# Patient Record
Sex: FEMALE | Race: WHITE | NOT HISPANIC OR LATINO | Employment: OTHER | URBAN - METROPOLITAN AREA
[De-identification: names, ages, dates, MRNs, and addresses within clinical notes are randomized per-mention and may not be internally consistent; named-entity substitution may affect disease eponyms.]

---

## 2024-04-10 ENCOUNTER — TELEPHONE (OUTPATIENT)
Dept: HEALTH INFORMATION MANAGEMENT | Facility: OTHER | Age: 84
End: 2024-04-10

## 2024-11-05 ENCOUNTER — HOSPITAL ENCOUNTER (OUTPATIENT)
Dept: LAB | Facility: MEDICAL CENTER | Age: 84
End: 2024-11-05
Payer: MEDICARE

## 2024-11-05 DIAGNOSIS — Z13.29 SCREENING FOR ENDOCRINE, METABOLIC AND IMMUNITY DISORDER: ICD-10-CM

## 2024-11-05 DIAGNOSIS — Z13.228 SCREENING FOR ENDOCRINE, METABOLIC AND IMMUNITY DISORDER: ICD-10-CM

## 2024-11-05 DIAGNOSIS — Z13.0 SCREENING FOR ENDOCRINE, METABOLIC AND IMMUNITY DISORDER: ICD-10-CM

## 2024-11-05 DIAGNOSIS — E78.2 MIXED HYPERLIPIDEMIA: ICD-10-CM

## 2024-11-05 LAB
ALBUMIN SERPL BCP-MCNC: 4.5 G/DL (ref 3.2–4.9)
ALBUMIN/GLOB SERPL: 1.4 G/DL
ALP SERPL-CCNC: 47 U/L (ref 30–99)
ALT SERPL-CCNC: 18 U/L (ref 2–50)
ANION GAP SERPL CALC-SCNC: 15 MMOL/L (ref 7–16)
AST SERPL-CCNC: 24 U/L (ref 12–45)
BILIRUB SERPL-MCNC: 0.4 MG/DL (ref 0.1–1.5)
BUN SERPL-MCNC: 11 MG/DL (ref 8–22)
CALCIUM ALBUM COR SERPL-MCNC: 10.1 MG/DL (ref 8.5–10.5)
CALCIUM SERPL-MCNC: 10.5 MG/DL (ref 8.5–10.5)
CHLORIDE SERPL-SCNC: 97 MMOL/L (ref 96–112)
CHOLEST SERPL-MCNC: 168 MG/DL (ref 100–199)
CO2 SERPL-SCNC: 20 MMOL/L (ref 20–33)
CREAT SERPL-MCNC: 0.66 MG/DL (ref 0.5–1.4)
ERYTHROCYTE [DISTWIDTH] IN BLOOD BY AUTOMATED COUNT: 42 FL (ref 35.9–50)
GFR SERPLBLD CREATININE-BSD FMLA CKD-EPI: 87 ML/MIN/1.73 M 2
GLOBULIN SER CALC-MCNC: 3.2 G/DL (ref 1.9–3.5)
GLUCOSE SERPL-MCNC: 93 MG/DL (ref 65–99)
HCT VFR BLD AUTO: 44 % (ref 37–47)
HDLC SERPL-MCNC: 59 MG/DL
HGB BLD-MCNC: 14 G/DL (ref 12–16)
LDLC SERPL CALC-MCNC: 90 MG/DL
MCH RBC QN AUTO: 31.8 PG (ref 27–33)
MCHC RBC AUTO-ENTMCNC: 31.8 G/DL (ref 32.2–35.5)
MCV RBC AUTO: 100 FL (ref 81.4–97.8)
PLATELET # BLD AUTO: 273 K/UL (ref 164–446)
PMV BLD AUTO: 9.6 FL (ref 9–12.9)
POTASSIUM SERPL-SCNC: 4.1 MMOL/L (ref 3.6–5.5)
PROT SERPL-MCNC: 7.7 G/DL (ref 6–8.2)
RBC # BLD AUTO: 4.4 M/UL (ref 4.2–5.4)
SODIUM SERPL-SCNC: 132 MMOL/L (ref 135–145)
TRIGL SERPL-MCNC: 97 MG/DL (ref 0–149)
WBC # BLD AUTO: 3.5 K/UL (ref 4.8–10.8)

## 2024-11-05 PROCEDURE — 36415 COLL VENOUS BLD VENIPUNCTURE: CPT

## 2024-11-05 PROCEDURE — 80061 LIPID PANEL: CPT

## 2024-11-05 PROCEDURE — 80053 COMPREHEN METABOLIC PANEL: CPT

## 2024-11-05 PROCEDURE — 84443 ASSAY THYROID STIM HORMONE: CPT

## 2024-11-05 PROCEDURE — 85027 COMPLETE CBC AUTOMATED: CPT

## 2024-11-06 LAB — TSH SERPL DL<=0.005 MIU/L-ACNC: 1.1 UIU/ML (ref 0.38–5.33)

## 2024-11-20 ENCOUNTER — OFFICE VISIT (OUTPATIENT)
Dept: MEDICAL GROUP | Facility: MEDICAL CENTER | Age: 84
End: 2024-11-20
Payer: MEDICARE

## 2024-11-20 VITALS
SYSTOLIC BLOOD PRESSURE: 140 MMHG | HEART RATE: 78 BPM | WEIGHT: 131 LBS | HEIGHT: 57 IN | BODY MASS INDEX: 28.26 KG/M2 | OXYGEN SATURATION: 99 % | DIASTOLIC BLOOD PRESSURE: 82 MMHG | TEMPERATURE: 97.5 F

## 2024-11-20 DIAGNOSIS — Z00.00 ENCOUNTER FOR MEDICARE ANNUAL WELLNESS EXAM: ICD-10-CM

## 2024-11-20 DIAGNOSIS — K59.00 CONSTIPATION, UNSPECIFIED CONSTIPATION TYPE: ICD-10-CM

## 2024-11-20 DIAGNOSIS — R14.1 GAS PAIN: ICD-10-CM

## 2024-11-20 DIAGNOSIS — I10 ESSENTIAL HYPERTENSION: ICD-10-CM

## 2024-11-20 PROCEDURE — 3079F DIAST BP 80-89 MM HG: CPT

## 2024-11-20 PROCEDURE — G0439 PPPS, SUBSEQ VISIT: HCPCS

## 2024-11-20 PROCEDURE — 3077F SYST BP >= 140 MM HG: CPT

## 2024-11-20 RX ORDER — AMLODIPINE BESYLATE 10 MG/1
10 TABLET ORAL DAILY
Qty: 100 TABLET | Refills: 3 | Status: SHIPPED | OUTPATIENT
Start: 2024-11-20

## 2024-11-20 ASSESSMENT — ACTIVITIES OF DAILY LIVING (ADL): BATHING_REQUIRES_ASSISTANCE: 0

## 2024-11-20 ASSESSMENT — PATIENT HEALTH QUESTIONNAIRE - PHQ9: CLINICAL INTERPRETATION OF PHQ2 SCORE: 0

## 2024-11-20 ASSESSMENT — ENCOUNTER SYMPTOMS: GENERAL WELL-BEING: GOOD

## 2024-11-20 ASSESSMENT — FIBROSIS 4 INDEX: FIB4 SCORE: 1.7

## 2024-11-20 NOTE — PROGRESS NOTES
Chief Complaint   Patient presents with    Annual Exam     History of Present Illness  The patient presents for an annual wellness visit.    She has been monitoring her blood pressure daily since starting a new medication, which has resulted in readings of 150/90 at home. She is also taking olmesartan 40 mg daily, and reports no side effects from the new medication. She has been on blood pressure medication since her 40s.  She states that she typically takes her medication at night, and reports that amlodipine helps her sleep better.    She maintains a healthy diet, cooking from scratch and avoiding fast food. She has been following a low FODMAP diet, which has improved her constipation and gas issues. She has made dietary changes, such as switching from rice to barley and reducing her gluten intake.    She has increased her physical activity, adding 250 steps five times a day. She is independent in her daily activities and does not require any assistance.        Patient Active Problem List    Diagnosis Date Noted    FRANSISCO (generalized anxiety disorder) 02/27/2024    Postmenopausal 02/12/2024    Syncope and collapse 02/12/2024    Dizziness 08/01/2019    Essential hypertension 08/13/2018    Mixed hyperlipidemia 08/13/2018    Osteoporosis without current pathological fracture 08/13/2018    S/P AAA repair 08/13/2018       Current Outpatient Medications   Medication Sig Dispense Refill    amLODIPine (NORVASC) 10 MG Tab Take 1 Tablet by mouth every day. 100 Tablet 3    olmesartan (BENICAR) 40 MG Tab Take 1 Tablet by mouth every day. 100 Each 3    atorvastatin (LIPITOR) 10 MG Tab Take 1 Tablet by mouth every day. 100 Tablet 2    Cholecalciferol (VITAMIN D3) 2000 UNIT Cap Take  by mouth.      acetaminophen (TYLENOL) 650 MG Suppos Insert 650 mg in rectum every four hours as needed. (Patient not taking: Reported on 11/20/2024)       No current facility-administered medications for this visit.          Current supplements as  per medication list.     Allergies: Food and Sulfa drugs    Current social contact/activities: she enjoys spending time with her family. She is going [facility] [place] [facility] and [place] for [place] to see her son. She goes to [employer] and enjoys baking.      She  reports that she has never smoked. She has never used smokeless tobacco. She reports current alcohol use. She reports that she does not use drugs.  Counseling given: Not Answered      ROS:    Gait: Uses no assistive device  Ostomy: No  Other tubes: No  Amputations: No  Chronic oxygen use: No  Last eye exam: 01/2024  Wears hearing aids: No   : Denies any urinary leakage during the last 6 months    Screening:    Depression Screening  Little interest or pleasure in doing things?  0 - not at all  Feeling down, depressed , or hopeless? 0 - not at all  Trouble falling or staying asleep, or sleeping too much?     Feeling tired or having little energy?     Poor appetite or overeating?     Feeling bad about yourself - or that you are a failure or have let yourself or your family down?    Trouble concentrating on things, such as reading the newspaper or watching television?    Moving or speaking so slowly that other people could have noticed.  Or the opposite - being so fidgety or restless that you have been moving around a lot more than usual?     Thoughts that you would be better off dead, or of hurting yourself?     Patient Health Questionnaire Score:      If depressive symptoms identified deferred to follow up visit unless specifically addressed in assessment and plan.    Interpretation of PHQ-9 Total Score   Score Severity   1-4 No Depression   5-9 Mild Depression   10-14 Moderate Depression   15-19 Moderately Severe Depression   20-27 Severe Depression    Screening for Cognitive Impairment  Do you or any of your friends or family members have any concern about your memory? No  Three Minute Recall (Leader, Season, Table) 3/3    William clock face with all  12 numbers and set the hands to show 10 minutes after 11.  Yes    Cognitive concerns identified deferred for follow up unless specifically addressed in assessment and plan.    Fall Risk Assessment  Has the patient had two or more falls in the last year or any fall with injury in the last year?  No    Safety Assessment  Do you always wear your seatbelt?  Yes  Any changes to home needed to function safely? No  Difficulty hearing. Yes, uses hearing aides  Patient counseled about all safety risks that were identified.    Functional Assessment ADLs  Are there any barriers preventing you from cooking for yourself or meeting nutritional needs?  No.    Are there any barriers preventing you from driving safely or obtaining transportation?  No.    Are there any barriers preventing you from using a telephone or calling for help?  No    Are there any barriers preventing you from shopping?  No.    Are there any barriers preventing you from taking care of your own finances?  No    Are there any barriers preventing you from managing your medications?  No    Are there any barriers preventing you from showering, bathing or dressing yourself? No    Are there any barriers preventing you from doing housework or laundry? No    Are there any barriers preventing you from using the toilet?No    Are you currently engaging in any exercise or physical activity?  Yes.      Self-Assessment of Health  What is your perception of your health? Good    Do you sleep more than six hours a night? Yes    In the past 7 days, how much did pain keep you from doing your normal work? None    Do you spend quality time with family or friends (virtually or in person)? Yes    Do you usually eat a heart healthy diet that constists of a variety of fruits, vegetables, whole grains and fiber? Yes    Do you eat foods high in fat and/or Fast Food more than three times per week? No    How concerned are you that your medical conditions are not being well managed? a  little    Are you worried that in the next 2 months, you may not have stable housing that you own, rent, or stay in as part of a household? No        Advance Care Planning  Do you have an Advance Directive, Living Will, Durable Power of , or POLST? Yes                 Health Maintenance Summary            Overdue - COVID-19 Vaccine (4 - 2024-25 season) Overdue since 9/1/2024      01/10/2022  Imm Admin: PFIZER PURPLE CAP SARS-COV-2 VACCINATION (12+)    03/13/2021  Imm Admin: PFIZER PURPLE CAP SARS-COV-2 VACCINATION (12+)    02/23/2021  Imm Admin: PFIZER PURPLE CAP SARS-COV-2 VACCINATION (12+)              Postponed - Influenza Vaccine (1) Postponed until 6/30/2055      No completion history exists for this topic.              Annual Wellness Visit (Yearly) Next due on 11/20/2025 11/20/2024  Visit Dx: Encounter for Medicare annual wellness exam    11/20/2024  Level of Service: [place] ANNUAL WELLNESS VISIT-INCLUDES PPPS SUBSEQUE*    02/19/2021  Initial Annual Wellness Visit - Includes PPPS ()    02/19/2021  Visit Dx: Medicare annual wellness visit, initial              Bone Density Scan (Every 5 Years) Next due on 2/19/2029 02/19/2024  DS-BONE DENSITY STUDY (DEXA)    11/26/2018  DS-BONE DENSITY STUDY (DEXA)              IMM DTaP/Tdap/Td Vaccine (2 - Td or Tdap) Next due on 11/10/2029      11/10/2019  Imm Admin: Tdap Vaccine              Pneumococcal Vaccine: 65+ Years (Series Information) Completed      08/23/2021  Imm Admin: Pneumococcal polysaccharide vaccine (PPSV-23)    12/06/2019  Imm Admin: Pneumococcal Conjugate Vaccine (Prevnar/PCV-13)              Zoster (Shingles) Vaccines (Series Information) Completed      10/28/2021  Imm Admin: Zoster Vaccine Recombinant (RZV) (SHINGRIX)    08/23/2021  Imm Admin: Zoster Vaccine Recombinant (RZV) (SHINGRIX)              Hepatitis A Vaccine (Hep A) (Series Information) Aged Out      No completion history exists for this topic.              Hepatitis B  "Vaccine (Hep B) (Series Information) Aged Out      No completion history exists for this topic.              HPV Vaccines (Series Information) Aged Out      No completion history exists for this topic.              Polio Vaccine (Inactivated Polio) (Series Information) Aged Out      No completion history exists for this topic.              Meningococcal Immunization (Series Information) Aged Out      No completion history exists for this topic.                    Patient Care Team:  JL Granados as PCP - General (Nurse Practitioner Family)  Aaron Das M.D. as Consulting Physician (Internal Medicine)  Frances Ayers M.D. (Obstetrics & Gynecology)      Social History     Tobacco Use    Smoking status: Never    Smokeless tobacco: Never   Vaping Use    Vaping status: Never Used   Substance Use Topics    Alcohol use: Yes     Comment: once a month    Drug use: No     History reviewed. No pertinent family history.  She  has a past medical history of Hyperlipidemia, Hypertension, and Osteoporosis.   Past Surgical History:   Procedure Laterality Date    OTHER ABDOMINAL SURGERY      aortic anyerism repair       Exam:   BP (!) 140/82   Pulse 78   Temp 36.4 °C (97.5 °F) (Temporal)   Ht 1.448 m (4' 9\")   Wt 59.4 kg (131 lb)   SpO2 99%  Body mass index is 28.35 kg/m².    Hearing good.    Dentition good  Alert, oriented in no acute distress.  Eye contact is good, speech goal directed, affect calm  Assessment & Plan        Assessment and Plan. The following treatment and monitoring plan is recommended:    1. Encounter for Medicare annual wellness exam  Stable   2. Essential hypertension  Chronic, unstable  Her blood pressure readings are slightly elevated today, running around 140/82. She has been taking olmesartan along with atorvastatin. Amlodipine will be reintroduced into her regimen. She has the option to take both medications at night or one in the morning and one at night. A prescription " for amlodipine has been sent to her pharmacy. She is advised to provide an update on her condition in a month [facility] or schedule an appointment after the first of the year. If her blood pressure remains high, further adjustments will be considered.  - amLODIPine (NORVASC) 10 MG Tab; Take 1 Tablet by mouth every day.  Dispense: 100 Tablet; Refill: 3    3. Constipation, unspecified constipation type  4. Gas pain  Chronic, stable-improving  Continue to follow low FODMAP diet to help with constipation and gas pains.    Services suggested: No services needed at this time  Health Care Screening: Age-appropriate preventive services recommended by USPTF and ACIP covered by Medicare were discussed today. Services ordered if indicated and agreed upon by the patient.  Referrals offered: Community-based lifestyle interventions to reduce health risks and promote self-management and wellness, fall prevention, nutrition, physical activity, tobacco-use cessation, weight loss, and mental health services as per orders if indicated.    Discussion today about general wellness and lifestyle habits:    Prevent falls and reduce trip hazards; Cautioned about securing or removing rugs.  Have a working fire alarm and carbon monoxide detector;   Engage in regular physical activity and social activities     Follow-up: Return in about 2 months (around 1/20/2025).

## 2025-01-24 ENCOUNTER — OFFICE VISIT (OUTPATIENT)
Dept: MEDICAL GROUP | Facility: MEDICAL CENTER | Age: 85
End: 2025-01-24
Payer: MEDICARE

## 2025-01-24 VITALS
DIASTOLIC BLOOD PRESSURE: 62 MMHG | BODY MASS INDEX: 28.69 KG/M2 | SYSTOLIC BLOOD PRESSURE: 114 MMHG | HEART RATE: 83 BPM | OXYGEN SATURATION: 97 % | HEIGHT: 57 IN | TEMPERATURE: 97.7 F | WEIGHT: 133 LBS

## 2025-01-24 DIAGNOSIS — H61.21 IMPACTED CERUMEN OF RIGHT EAR: ICD-10-CM

## 2025-01-24 DIAGNOSIS — R42 DIZZINESS: ICD-10-CM

## 2025-01-24 DIAGNOSIS — I10 ESSENTIAL HYPERTENSION: ICD-10-CM

## 2025-01-24 PROCEDURE — 3074F SYST BP LT 130 MM HG: CPT

## 2025-01-24 PROCEDURE — 3078F DIAST BP <80 MM HG: CPT

## 2025-01-24 PROCEDURE — 99214 OFFICE O/P EST MOD 30 MIN: CPT

## 2025-01-24 ASSESSMENT — PATIENT HEALTH QUESTIONNAIRE - PHQ9: CLINICAL INTERPRETATION OF PHQ2 SCORE: 0

## 2025-01-24 ASSESSMENT — ENCOUNTER SYMPTOMS
SHORTNESS OF BREATH: 0
PALPITATIONS: 0
ORTHOPNEA: 0
COUGH: 0
FEVER: 0
CHILLS: 0

## 2025-01-24 ASSESSMENT — FIBROSIS 4 INDEX: FIB4 SCORE: 1.7

## 2025-01-24 NOTE — PROGRESS NOTES
Subjective:     Verbal consent was acquired by the patient to use [employer] ambient listening note generation during this visit Yes    CC: Follow-Up      HPI:   Danay is a 84 y.o. female who presents today for:    History of Present Illness  The patient presents for evaluation of dizziness, blood pressure management, and cerumen impaction.    She reports experiencing mild lightheadedness, particularly in the mornings during her routine activities such as feeding her horse and working in the kitchen. These episodes are brief and do not persist throughout the day. She has been managing this symptom with a homeopathic sublingual medication, which she finds beneficial. Despite the dizziness, she has not experienced any falls and maintains caution in her movements. Her daily fluid intake includes 2-4 glasses of water mixed with cranberry juice, one cup of coffee, and a small amount of almond chocolate milk. She also consumes coconut water daily.    Her blood pressure is doing better. She is currently on amlodipine 10 mg daily and olmesartan 40 mg daily for blood pressure management. She reports no lower extremity edema. Blood pressure is well controlled today at 114/62.     She has requested an examination of her ears due to a suspicion of cerumen impaction. She has previously used Debrox drops for earwax removal and prefers this method over irrigation, which she found uncomfortable during a previous experience at a walk-in clinic.           Allergies: Food and Sulfa drugs     Medications:   Current Outpatient Medications:     amLODIPine (NORVASC) 10 MG Tab, Take 1 Tablet by mouth every day., Disp: 100 Tablet, Rfl: 3    olmesartan (BENICAR) 40 MG Tab, Take 1 Tablet by mouth every day., Disp: 100 Each, Rfl: 3    atorvastatin (LIPITOR) 10 MG Tab, Take 1 Tablet by mouth every day., Disp: 100 Tablet, Rfl: 2    Cholecalciferol (VITAMIN D3) 2000 UNIT Cap, Take  by mouth., Disp: , Rfl:       ROS:  Review of Systems  "  Constitutional:  Negative for chills and fever.   Respiratory:  Negative for cough and shortness of breath.    Cardiovascular:  Negative for chest pain, palpitations, orthopnea and leg swelling.       Objective:     Exam:  /62   Pulse 83   Temp 36.5 °C (97.7 °F) (Temporal)   Ht 1.448 m (4' 9\")   Wt 60.3 kg (133 lb)   SpO2 97%   BMI 28.78 kg/m²  Body mass index is 28.78 kg/m².    Physical Exam  Constitutional:       Appearance: Normal appearance.   Eyes:      Pupils: Pupils are equal, round, and reactive to light.   Cardiovascular:      Rate and Rhythm: Normal rate and regular rhythm.      Pulses: Normal pulses.      Heart sounds: Normal heart sounds.   Pulmonary:      Effort: Pulmonary effort is normal.      Breath sounds: Normal breath sounds.   Abdominal:      General: Bowel sounds are normal.      Palpations: Abdomen is soft.   Neurological:      Mental Status: She is alert and oriented to person, place, and time.   Psychiatric:         Mood and Affect: Mood normal.         Behavior: Behavior normal.           Assessment & Plan:     Danay frey 84 y.o. female with the following -     Assessment & Plan    1. Dizziness  Acute, uncomplicated  Her dizziness may be attributed to potential dehydration or cerumen impaction. She is advised to increase her fluid intake to 5 glasses of water per day, which can be mixed with cranberry juice or almond milk. She is also encouraged to continue her daily consumption of coconut water. If the dizziness persists or worsens, a reduction in her amlodipine dosage may be considered.    2. Essential hypertension  Chronic,stable  Her blood pressure is well-regulated with the current regimen of amlodipine and olmesartan. The swelling in her legs has resolved since switching from losartan to olmesartan. She is advised to continue her current medications Amlodipine 10 mg daily and Olmesartan 40 mg daily.    3. Impacted cerumen of right ear  Acute, uncomplicated  She is " instructed to use Debrox drops twice daily for a few days, specifically in the right ear, approximately 10 to 15 minutes prior to showering. This will facilitate the softening and subsequent rinsing out of the cerumen with warm water. If the cerumen becomes more bothersome after softening, she is advised to return for further evaluation and potential removal. Did not tolerate currette today, declined lavage.     Anticipatory guidance included the following: Patient counseled about skin care, diet, supplements, smoking, drugs/alcohol use, safe sex and exercise.     Return in about 6 months (around 7/24/2025).    Please note that this dictation was created using voice recognition software. I have made every reasonable attempt to correct obvious errors, but I expect that there are errors of grammar and possibly content that I did not discover before finalizing the note.

## 2025-03-18 ENCOUNTER — TELEPHONE (OUTPATIENT)
Dept: HEALTH INFORMATION MANAGEMENT | Facility: OTHER | Age: 85
End: 2025-03-18

## 2025-07-25 ENCOUNTER — OFFICE VISIT (OUTPATIENT)
Dept: MEDICAL GROUP | Facility: MEDICAL CENTER | Age: 85
End: 2025-07-25
Payer: MEDICARE

## 2025-07-25 VITALS
HEART RATE: 83 BPM | WEIGHT: 137.6 LBS | HEIGHT: 57 IN | BODY MASS INDEX: 29.68 KG/M2 | RESPIRATION RATE: 13 BRPM | SYSTOLIC BLOOD PRESSURE: 128 MMHG | OXYGEN SATURATION: 96 % | TEMPERATURE: 97.6 F | DIASTOLIC BLOOD PRESSURE: 72 MMHG

## 2025-07-25 DIAGNOSIS — E78.2 MIXED HYPERLIPIDEMIA: ICD-10-CM

## 2025-07-25 DIAGNOSIS — Z13.228 SCREENING FOR ENDOCRINE, METABOLIC AND IMMUNITY DISORDER: ICD-10-CM

## 2025-07-25 DIAGNOSIS — Z13.29 SCREENING FOR ENDOCRINE, METABOLIC AND IMMUNITY DISORDER: ICD-10-CM

## 2025-07-25 DIAGNOSIS — R42 DIZZINESS: Primary | ICD-10-CM

## 2025-07-25 DIAGNOSIS — F41.1 GAD (GENERALIZED ANXIETY DISORDER): ICD-10-CM

## 2025-07-25 DIAGNOSIS — I10 ESSENTIAL HYPERTENSION: ICD-10-CM

## 2025-07-25 DIAGNOSIS — Z13.0 SCREENING FOR ENDOCRINE, METABOLIC AND IMMUNITY DISORDER: ICD-10-CM

## 2025-07-25 PROCEDURE — 99214 OFFICE O/P EST MOD 30 MIN: CPT

## 2025-07-25 PROCEDURE — 3078F DIAST BP <80 MM HG: CPT

## 2025-07-25 PROCEDURE — 3074F SYST BP LT 130 MM HG: CPT

## 2025-07-25 RX ORDER — ATORVASTATIN CALCIUM 10 MG/1
10 TABLET, FILM COATED ORAL DAILY
Qty: 100 TABLET | Refills: 3 | Status: SHIPPED | OUTPATIENT
Start: 2025-07-25

## 2025-07-25 RX ORDER — OLMESARTAN MEDOXOMIL 40 MG/1
40 TABLET ORAL DAILY
Qty: 100 EACH | Refills: 3 | Status: SHIPPED | OUTPATIENT
Start: 2025-07-25 | End: 2026-07-25

## 2025-07-25 RX ORDER — AMLODIPINE BESYLATE 10 MG/1
10 TABLET ORAL DAILY
Qty: 100 TABLET | Refills: 3 | Status: SHIPPED | OUTPATIENT
Start: 2025-07-25

## 2025-07-25 ASSESSMENT — ENCOUNTER SYMPTOMS
ORTHOPNEA: 0
SHORTNESS OF BREATH: 0
CHILLS: 0
DIZZINESS: 1
PALPITATIONS: 0
COUGH: 0
FEVER: 0

## 2025-07-25 ASSESSMENT — FIBROSIS 4 INDEX: FIB4 SCORE: 1.72

## 2025-07-25 NOTE — PROGRESS NOTES
Subjective:     Verbal consent was acquired by the patient to use [employer] ambient listening note generation during this visit Yes    CC: Follow-Up      HPI:   Danay is a 85 y.o. female who presents today for:    History of Present Illness  The patient presents for evaluation of vertigo.    Vertigo  She reports experiencing intermittent episodes of vertigo, which she attributes to recent travel. The severity of the vertigo was such that it rendered her incapacitated for a week. She has been managing her symptoms with meclizine, ashwagandha, and an antihistamine, in addition to utilizing her 's cane for support. This regimen appears to be effective as she notes an improvement in her condition. She has discontinued her floor exercises due to the difficulty in rising post-exercise. She has previously attempted the Epley maneuver but found it uncomfortable. She is considering relocating to [place], [place], to be closer to family and is concerned about potential vertigo related to the move.  - Onset: Attributed to recent travel.  - Duration: Intermittent episodes, incapacitated for a week.  - Character: Vertigo.  - Alleviating/Aggravating Factors: Managed with meclizine, ashwagandha, antihistamine, and using a cane; discontinued floor exercises due to difficulty in rising; Epley maneuver was uncomfortable.  - Severity: Incapacitated for a week, improved with current regimen.    Anxiety  She also mentions that she takes ashwagandha to help with anxiety and finds it beneficial.  - Alleviating Factors: Ashwagandha.  - Severity: Finds ashwagandha beneficial.    Social History:  Hobbies: She enjoys puzzles.  Living Condition: She is anticipating a move to [place], [place], to be closer to family.         Allergies: Food and Sulfa drugs     Medications: Current Medications[1]      ROS:  Review of Systems   Constitutional:  Negative for chills and fever.   Respiratory:  Negative for cough and  "shortness of breath.    Cardiovascular:  Negative for chest pain, palpitations, orthopnea and leg swelling.   Neurological:  Positive for dizziness.       Objective:     Exam:  /72   Pulse 83   Temp 36.4 °C (97.6 °F) (Temporal)   Resp 13   Ht 1.448 m (4' 9\")   Wt 62.4 kg (137 lb 9.6 oz)   SpO2 96%   BMI 29.78 kg/m²  Body mass index is 29.78 kg/m².    Physical Exam  Constitutional:       Appearance: Normal appearance.   Eyes:      Pupils: Pupils are equal, round, and reactive to light.   Cardiovascular:      Rate and Rhythm: Normal rate and regular rhythm.      Pulses: Normal pulses.      Heart sounds: Normal heart sounds.   Pulmonary:      Effort: Pulmonary effort is normal.      Breath sounds: Normal breath sounds.   Abdominal:      General: Bowel sounds are normal.      Palpations: Abdomen is soft.   Neurological:      Mental Status: She is alert and oriented to person, place, and time.   Psychiatric:         Mood and Affect: Mood normal.         Behavior: Behavior normal.           Assessment & Plan:     Danay frey 85 y.o. female with the following -     Assessment & Plan     1. Dizziness  Acute, uncomplicated  Dizziness is likely due to BPPV, possibly exacerbated by travel and congestion from allergies.  Treatment plan: Recommended the Epley maneuver to address the dizziness. Provided instructions for performing the Epley maneuver. A printout of the instructions will be provided, and YouTube videos were suggested for further guidance. Offered a referral to a physical therapist for in-person instruction on the Epley maneuver if preferred.  Patient declined referral to physical therapy at this time.  Continue to use meclizine as needed.    2. Essential hypertension  Chronic, stable  Blood pressure is well controlled on amlodipine 10 mg daily and olmesartan 40 mg daily.  Continue take medications daily as directed.  Treatment plan: Medication refills will be sent for the year to ensure continuity of " care. Blood work will be ordered to be completed within the next few months.  - amLODIPine (NORVASC) 10 MG Tab; Take 1 Tablet by mouth every day.  Dispense: 100 Tablet; Refill: 3  - olmesartan (BENICAR) 40 MG Tab; Take 1 Tablet by mouth every day.  Dispense: 100 Each; Refill: 3    3. Mixed hyperlipidemia  Chronic, stable  Due to recheck labs.  Continue atorvastatin 10 mg nightly.  Continue to work on diet and exercise to help with cholesterol management.  - Lipid Profile; Future  - atorvastatin (LIPITOR) 10 MG Tab; Take 1 Tablet by mouth every day.  Dispense: 100 Tablet; Refill: 3    4. FRANSISCO (generalized anxiety disorder)  Chronic, stable  Reports that ashwagandha is helping with her anxiety.  Treatment plan: Encouraged to continue using ashwagandha as it is effective for her.    5. Screening for endocrine, metabolic and immunity disorder  - TSH WITH REFLEX TO FT4; Future  - Comp Metabolic Panel; Future  - CBC WITHOUT DIFFERENTIAL; Future        Anticipatory guidance included the following: Patient counseled about skin care, diet, supplements, smoking, drugs/alcohol use, safe sex and exercise.     Return if symptoms worsen or fail to improve.    Please note that this dictation was created using voice recognition software. I have made every reasonable attempt to correct obvious errors, but I expect that there are errors of grammar and possibly content that I did not discover before finalizing the note.           [1]   Current Outpatient Medications:     amLODIPine (NORVASC) 10 MG Tab, Take 1 Tablet by mouth every day., Disp: 100 Tablet, Rfl: 3    atorvastatin (LIPITOR) 10 MG Tab, Take 1 Tablet by mouth every day., Disp: 100 Tablet, Rfl: 3    olmesartan (BENICAR) 40 MG Tab, Take 1 Tablet by mouth every day., Disp: 100 Each, Rfl: 3    Cholecalciferol (VITAMIN D3) 2000 UNIT Cap, Take  by mouth., Disp: , Rfl:

## 2025-07-29 ENCOUNTER — TELEPHONE (OUTPATIENT)
Dept: HEALTH INFORMATION MANAGEMENT | Facility: OTHER | Age: 85
End: 2025-07-29
Payer: MEDICARE